# Patient Record
Sex: MALE | Race: WHITE | NOT HISPANIC OR LATINO | Employment: UNEMPLOYED | URBAN - METROPOLITAN AREA
[De-identification: names, ages, dates, MRNs, and addresses within clinical notes are randomized per-mention and may not be internally consistent; named-entity substitution may affect disease eponyms.]

---

## 2020-06-05 ENCOUNTER — HOSPITAL ENCOUNTER (INPATIENT)
Facility: HOSPITAL | Age: 50
LOS: 1 days | Discharge: HOME | DRG: 536 | End: 2020-06-06
Attending: EMERGENCY MEDICINE | Admitting: INTERNAL MEDICINE
Payer: MEDICAID

## 2020-06-05 DIAGNOSIS — Z72.0 TOBACCO ABUSE: ICD-10-CM

## 2020-06-05 DIAGNOSIS — S72.052D CLOSED FRACTURE OF HEAD OF LEFT FEMUR WITH ROUTINE HEALING, SUBSEQUENT ENCOUNTER: ICD-10-CM

## 2020-06-05 DIAGNOSIS — S46.911D STRAIN OF ACROMIOCLAVICULAR JOINT, RIGHT, SUBSEQUENT ENCOUNTER: ICD-10-CM

## 2020-06-05 DIAGNOSIS — S72.052A CLOSED FRACTURE OF HEAD OF LEFT FEMUR, INITIAL ENCOUNTER: Primary | ICD-10-CM

## 2020-06-05 DIAGNOSIS — S01.81XA LACERATION OF FOREHEAD, INITIAL ENCOUNTER: ICD-10-CM
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
## 2020-06-05 DIAGNOSIS — S46.919A: ICD-10-CM

## 2020-06-05 DIAGNOSIS — S72.059A: ICD-10-CM

## 2020-06-05 DIAGNOSIS — S46.911A STRAIN OF ACROMIOCLAVICULAR JOINT, RIGHT, INITIAL ENCOUNTER: ICD-10-CM

## 2020-06-05 LAB
APTT BLDCRRT: 25 SEC (ref 21–32)
BASOPHILS # BLD AUTO: 0.04 K/UL (ref 0–0.2)
BASOPHILS NFR BLD: 0.3 % (ref 0–1.9)
DIFFERENTIAL METHOD: ABNORMAL
EOSINOPHIL # BLD AUTO: 0.3 K/UL (ref 0–0.5)
EOSINOPHIL NFR BLD: 2.2 % (ref 0–8)
ERYTHROCYTE [DISTWIDTH] IN BLOOD BY AUTOMATED COUNT: 12.5 % (ref 11.5–14.5)
HCT VFR BLD AUTO: 45.3 % (ref 40–54)
HGB BLD-MCNC: 15.7 G/DL (ref 14–18)
IMM GRANULOCYTES # BLD AUTO: 0.04 K/UL (ref 0–0.04)
IMM GRANULOCYTES NFR BLD AUTO: 0.3 % (ref 0–0.5)
INR PPP: 1 (ref 0.8–1.2)
LYMPHOCYTES # BLD AUTO: 2.9 K/UL (ref 1–4.8)
LYMPHOCYTES NFR BLD: 23.6 % (ref 18–48)
MCH RBC QN AUTO: 33.1 PG (ref 27–31)
MCHC RBC AUTO-ENTMCNC: 34.7 G/DL (ref 32–36)
MCV RBC AUTO: 96 FL (ref 82–98)
MONOCYTES # BLD AUTO: 1.2 K/UL (ref 0.3–1)
MONOCYTES NFR BLD: 10 % (ref 4–15)
NEUTROPHILS # BLD AUTO: 7.9 K/UL (ref 1.8–7.7)
NEUTROPHILS NFR BLD: 63.6 % (ref 38–73)
NRBC BLD-RTO: 0 /100 WBC
PLATELET # BLD AUTO: 346 K/UL (ref 150–350)
PMV BLD AUTO: 9.3 FL (ref 9.2–12.9)
PROTHROMBIN TIME: 10.6 SEC (ref 9–12.5)
RBC # BLD AUTO: 4.74 M/UL (ref 4.6–6.2)
SARS-COV-2 RDRP RESP QL NAA+PROBE: NEGATIVE
WBC # BLD AUTO: 12.39 K/UL (ref 3.9–12.7)

## 2020-06-05 PROCEDURE — 99233 PR SUBSEQUENT HOSPITAL CARE,LEVL III: ICD-10-PCS | Mod: ,,, | Performed by: ORTHOPAEDIC SURGERY

## 2020-06-05 PROCEDURE — 99285 EMERGENCY DEPT VISIT HI MDM: CPT | Mod: 25

## 2020-06-05 PROCEDURE — 12041 INTMD RPR N-HF/GENIT 2.5CM/<: CPT

## 2020-06-05 PROCEDURE — 93010 ELECTROCARDIOGRAM REPORT: CPT | Mod: ,,, | Performed by: INTERNAL MEDICINE

## 2020-06-05 PROCEDURE — 25000003 PHARM REV CODE 250: Performed by: PHYSICIAN ASSISTANT

## 2020-06-05 PROCEDURE — 85730 THROMBOPLASTIN TIME PARTIAL: CPT

## 2020-06-05 PROCEDURE — U0002 COVID-19 LAB TEST NON-CDC: HCPCS

## 2020-06-05 PROCEDURE — 12013 RPR F/E/E/N/L/M 2.6-5.0 CM: CPT | Mod: ,,, | Performed by: PHYSICIAN ASSISTANT

## 2020-06-05 PROCEDURE — S4991 NICOTINE PATCH NONLEGEND: HCPCS | Performed by: PHYSICIAN ASSISTANT

## 2020-06-05 PROCEDURE — 99233 SBSQ HOSP IP/OBS HIGH 50: CPT | Mod: ,,, | Performed by: ORTHOPAEDIC SURGERY

## 2020-06-05 PROCEDURE — 99223 PR INITIAL HOSPITAL CARE,LEVL III: ICD-10-PCS | Mod: ,,, | Performed by: HOSPITALIST

## 2020-06-05 PROCEDURE — 85610 PROTHROMBIN TIME: CPT

## 2020-06-05 PROCEDURE — 84134 ASSAY OF PREALBUMIN: CPT

## 2020-06-05 PROCEDURE — 86901 BLOOD TYPING SEROLOGIC RH(D): CPT

## 2020-06-05 PROCEDURE — 82306 VITAMIN D 25 HYDROXY: CPT

## 2020-06-05 PROCEDURE — 99284 EMERGENCY DEPT VISIT MOD MDM: CPT | Mod: 25,,, | Performed by: PHYSICIAN ASSISTANT

## 2020-06-05 PROCEDURE — 12013 RPR F/E/E/N/L/M 2.6-5.0 CM: CPT | Mod: 59

## 2020-06-05 PROCEDURE — 99223 1ST HOSP IP/OBS HIGH 75: CPT | Mod: ,,, | Performed by: HOSPITALIST

## 2020-06-05 PROCEDURE — 12013 PR RESUPERF WND FACE 2.6-5 CM: ICD-10-PCS | Mod: ,,, | Performed by: PHYSICIAN ASSISTANT

## 2020-06-05 PROCEDURE — 93005 ELECTROCARDIOGRAM TRACING: CPT

## 2020-06-05 PROCEDURE — 84466 ASSAY OF TRANSFERRIN: CPT

## 2020-06-05 PROCEDURE — 80053 COMPREHEN METABOLIC PANEL: CPT

## 2020-06-05 PROCEDURE — 12000002 HC ACUTE/MED SURGE SEMI-PRIVATE ROOM

## 2020-06-05 PROCEDURE — 93010 EKG 12-LEAD: ICD-10-PCS | Mod: ,,, | Performed by: INTERNAL MEDICINE

## 2020-06-05 PROCEDURE — 99284 PR EMERGENCY DEPT VISIT,LEVEL IV: ICD-10-PCS | Mod: 25,,, | Performed by: PHYSICIAN ASSISTANT

## 2020-06-05 PROCEDURE — 85025 COMPLETE CBC W/AUTO DIFF WBC: CPT

## 2020-06-05 RX ORDER — IPRATROPIUM BROMIDE AND ALBUTEROL SULFATE 2.5; .5 MG/3ML; MG/3ML
3 SOLUTION RESPIRATORY (INHALATION) EVERY 6 HOURS PRN
Status: DISCONTINUED | OUTPATIENT
Start: 2020-06-06 | End: 2020-06-06 | Stop reason: HOSPADM

## 2020-06-05 RX ORDER — NAPROXEN 500 MG/1
500 TABLET ORAL 2 TIMES DAILY WITH MEALS
Qty: 14 TABLET | Refills: 0 | Status: SHIPPED | OUTPATIENT
Start: 2020-06-05 | End: 2020-06-05 | Stop reason: CLARIF

## 2020-06-05 RX ORDER — PROCHLORPERAZINE EDISYLATE 5 MG/ML
5 INJECTION INTRAMUSCULAR; INTRAVENOUS EVERY 6 HOURS PRN
Status: DISCONTINUED | OUTPATIENT
Start: 2020-06-06 | End: 2020-06-06 | Stop reason: HOSPADM

## 2020-06-05 RX ORDER — OXYCODONE HYDROCHLORIDE 10 MG/1
10 TABLET ORAL EVERY 6 HOURS PRN
Status: DISCONTINUED | OUTPATIENT
Start: 2020-06-06 | End: 2020-06-06 | Stop reason: HOSPADM

## 2020-06-05 RX ORDER — IBUPROFEN 200 MG
24 TABLET ORAL
Status: DISCONTINUED | OUTPATIENT
Start: 2020-06-06 | End: 2020-06-06 | Stop reason: HOSPADM

## 2020-06-05 RX ORDER — TALC
6 POWDER (GRAM) TOPICAL NIGHTLY PRN
Status: DISCONTINUED | OUTPATIENT
Start: 2020-06-06 | End: 2020-06-06 | Stop reason: HOSPADM

## 2020-06-05 RX ORDER — HYDROCODONE BITARTRATE AND ACETAMINOPHEN 5; 325 MG/1; MG/1
1 TABLET ORAL
Status: COMPLETED | OUTPATIENT
Start: 2020-06-05 | End: 2020-06-05

## 2020-06-05 RX ORDER — ACETAMINOPHEN 500 MG
1000 TABLET ORAL
Status: COMPLETED | OUTPATIENT
Start: 2020-06-05 | End: 2020-06-05

## 2020-06-05 RX ORDER — OXYCODONE HYDROCHLORIDE 5 MG/1
5 TABLET ORAL EVERY 6 HOURS PRN
Status: DISCONTINUED | OUTPATIENT
Start: 2020-06-06 | End: 2020-06-06 | Stop reason: HOSPADM

## 2020-06-05 RX ORDER — SODIUM CHLORIDE 0.9 % (FLUSH) 0.9 %
10 SYRINGE (ML) INJECTION
Status: DISCONTINUED | OUTPATIENT
Start: 2020-06-06 | End: 2020-06-06 | Stop reason: HOSPADM

## 2020-06-05 RX ORDER — IBUPROFEN 200 MG
1 TABLET ORAL
Status: DISCONTINUED | OUTPATIENT
Start: 2020-06-05 | End: 2020-06-06 | Stop reason: HOSPADM

## 2020-06-05 RX ORDER — ONDANSETRON 2 MG/ML
4 INJECTION INTRAMUSCULAR; INTRAVENOUS EVERY 8 HOURS PRN
Status: DISCONTINUED | OUTPATIENT
Start: 2020-06-06 | End: 2020-06-06 | Stop reason: HOSPADM

## 2020-06-05 RX ORDER — OXYCODONE HYDROCHLORIDE 5 MG/1
5 TABLET ORAL
Status: DISCONTINUED | OUTPATIENT
Start: 2020-06-05 | End: 2020-06-05

## 2020-06-05 RX ORDER — ACETAMINOPHEN 325 MG/1
650 TABLET ORAL EVERY 4 HOURS PRN
Status: DISCONTINUED | OUTPATIENT
Start: 2020-06-06 | End: 2020-06-06 | Stop reason: HOSPADM

## 2020-06-05 RX ORDER — NAPROXEN 500 MG/1
500 TABLET ORAL
Status: COMPLETED | OUTPATIENT
Start: 2020-06-05 | End: 2020-06-05

## 2020-06-05 RX ORDER — LIDOCAINE HYDROCHLORIDE 10 MG/ML
5 INJECTION, SOLUTION EPIDURAL; INFILTRATION; INTRACAUDAL; PERINEURAL
Status: COMPLETED | OUTPATIENT
Start: 2020-06-05 | End: 2020-06-05

## 2020-06-05 RX ORDER — IBUPROFEN 200 MG
16 TABLET ORAL
Status: DISCONTINUED | OUTPATIENT
Start: 2020-06-06 | End: 2020-06-06 | Stop reason: HOSPADM

## 2020-06-05 RX ADMIN — LIDOCAINE HYDROCHLORIDE 50 MG: 10 INJECTION, SOLUTION EPIDURAL; INFILTRATION; INTRACAUDAL; PERINEURAL at 06:06

## 2020-06-05 RX ADMIN — ACETAMINOPHEN 1000 MG: 500 TABLET ORAL at 06:06

## 2020-06-05 RX ADMIN — NICOTINE 1 PATCH: 21 PATCH, EXTENDED RELEASE TRANSDERMAL at 11:06

## 2020-06-05 RX ADMIN — HYDROCODONE BITARTRATE AND ACETAMINOPHEN 1 TABLET: 5; 325 TABLET ORAL at 07:06

## 2020-06-05 RX ADMIN — NAPROXEN 500 MG: 500 TABLET ORAL at 06:06
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
## 2020-06-05 NOTE — ED NOTES
LOC: The patient is awake, alert, and oriented to place, time, situation. Affect is appropriate.  Speech is appropriate and clear.     APPEARANCE: Patient resting comfortably in no acute distress.  Patient is clean and well groomed.    SKIN: The skin is warm and dry; color consistent with ethnicity.  Patient has normal skin turgor and moist mucus membranes.  Skin laceration to upper portion of his head. .     MUSCULOSKELETAL: Pt unable to have from due to pain of right shoulder, moves left UUE w/o problems, able to ambulate with steady gait, denies weakness.     RESPIRATORY: Airway is open and patent. Respirations spontaneous, even, easy, and non-labored.  Patient has a normal effort and rate.  No accessory muscle use noted. Denies cough.     CARDIAC:  Normal rhythm and rate noted.  No peripheral edema noted. No complaints of chest pain.      ABDOMEN: Soft and non tender to palpation.  No distention noted.     NEUROLOGIC: Eyes open spontaneously.  Behavior appropriate to situation.  Follows commands; facial expression symmetrical.  Purposeful motor response noted; normal sensation in all extremities.
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
## 2020-06-06 VITALS
SYSTOLIC BLOOD PRESSURE: 119 MMHG | RESPIRATION RATE: 18 BRPM | TEMPERATURE: 97 F | HEIGHT: 72 IN | HEART RATE: 52 BPM | WEIGHT: 140 LBS | OXYGEN SATURATION: 100 % | DIASTOLIC BLOOD PRESSURE: 74 MMHG | BODY MASS INDEX: 18.96 KG/M2

## 2020-06-06 PROBLEM — Z72.0 TOBACCO ABUSE: Status: ACTIVE | Noted: 2020-06-06

## 2020-06-06 LAB
25(OH)D3+25(OH)D2 SERPL-MCNC: 43 NG/ML (ref 30–96)
ABO + RH BLD: NORMAL
ALBUMIN SERPL BCP-MCNC: 4.5 G/DL (ref 3.5–5.2)
ALP SERPL-CCNC: 74 U/L (ref 55–135)
ALT SERPL W/O P-5'-P-CCNC: 22 U/L (ref 10–44)
ANION GAP SERPL CALC-SCNC: 10 MMOL/L (ref 8–16)
ANION GAP SERPL CALC-SCNC: 14 MMOL/L (ref 8–16)
AST SERPL-CCNC: 22 U/L (ref 10–40)
BASOPHILS # BLD AUTO: 0.04 K/UL (ref 0–0.2)
BASOPHILS NFR BLD: 0.4 % (ref 0–1.9)
BILIRUB SERPL-MCNC: 0.9 MG/DL (ref 0.1–1)
BILIRUB UR QL STRIP: NEGATIVE
BLD GP AB SCN CELLS X3 SERPL QL: NORMAL
BUN SERPL-MCNC: 6 MG/DL (ref 6–20)
BUN SERPL-MCNC: 7 MG/DL (ref 6–20)
CALCIUM SERPL-MCNC: 8.7 MG/DL (ref 8.7–10.5)
CALCIUM SERPL-MCNC: 9.3 MG/DL (ref 8.7–10.5)
CHLORIDE SERPL-SCNC: 106 MMOL/L (ref 95–110)
CHLORIDE SERPL-SCNC: 106 MMOL/L (ref 95–110)
CLARITY UR REFRACT.AUTO: CLEAR
CO2 SERPL-SCNC: 20 MMOL/L (ref 23–29)
CO2 SERPL-SCNC: 22 MMOL/L (ref 23–29)
COLOR UR AUTO: YELLOW
CREAT SERPL-MCNC: 0.7 MG/DL (ref 0.5–1.4)
CREAT SERPL-MCNC: 0.8 MG/DL (ref 0.5–1.4)
DIFFERENTIAL METHOD: ABNORMAL
EOSINOPHIL # BLD AUTO: 0.4 K/UL (ref 0–0.5)
EOSINOPHIL NFR BLD: 3.9 % (ref 0–8)
ERYTHROCYTE [DISTWIDTH] IN BLOOD BY AUTOMATED COUNT: 12.4 % (ref 11.5–14.5)
EST. GFR  (AFRICAN AMERICAN): >60 ML/MIN/1.73 M^2
EST. GFR  (AFRICAN AMERICAN): >60 ML/MIN/1.73 M^2
EST. GFR  (NON AFRICAN AMERICAN): >60 ML/MIN/1.73 M^2
EST. GFR  (NON AFRICAN AMERICAN): >60 ML/MIN/1.73 M^2
GLUCOSE SERPL-MCNC: 75 MG/DL (ref 70–110)
GLUCOSE SERPL-MCNC: 85 MG/DL (ref 70–110)
GLUCOSE UR QL STRIP: NEGATIVE
HCT VFR BLD AUTO: 43.7 % (ref 40–54)
HGB BLD-MCNC: 14.5 G/DL (ref 14–18)
HGB UR QL STRIP: NEGATIVE
IMM GRANULOCYTES # BLD AUTO: 0.03 K/UL (ref 0–0.04)
IMM GRANULOCYTES NFR BLD AUTO: 0.3 % (ref 0–0.5)
KETONES UR QL STRIP: NEGATIVE
LEUKOCYTE ESTERASE UR QL STRIP: NEGATIVE
LYMPHOCYTES # BLD AUTO: 2.8 K/UL (ref 1–4.8)
LYMPHOCYTES NFR BLD: 27.5 % (ref 18–48)
MCH RBC QN AUTO: 33.3 PG (ref 27–31)
MCHC RBC AUTO-ENTMCNC: 33.2 G/DL (ref 32–36)
MCV RBC AUTO: 100 FL (ref 82–98)
MONOCYTES # BLD AUTO: 1.2 K/UL (ref 0.3–1)
MONOCYTES NFR BLD: 11.9 % (ref 4–15)
NEUTROPHILS # BLD AUTO: 5.7 K/UL (ref 1.8–7.7)
NEUTROPHILS NFR BLD: 56 % (ref 38–73)
NITRITE UR QL STRIP: NEGATIVE
NRBC BLD-RTO: 0 /100 WBC
PH UR STRIP: 5 [PH] (ref 5–8)
PLATELET # BLD AUTO: 283 K/UL (ref 150–350)
PMV BLD AUTO: 9.7 FL (ref 9.2–12.9)
POTASSIUM SERPL-SCNC: 3.8 MMOL/L (ref 3.5–5.1)
POTASSIUM SERPL-SCNC: 4 MMOL/L (ref 3.5–5.1)
PREALB SERPL-MCNC: 34 MG/DL (ref 20–43)
PROT SERPL-MCNC: 7.8 G/DL (ref 6–8.4)
PROT UR QL STRIP: NEGATIVE
RBC # BLD AUTO: 4.36 M/UL (ref 4.6–6.2)
SODIUM SERPL-SCNC: 138 MMOL/L (ref 136–145)
SODIUM SERPL-SCNC: 140 MMOL/L (ref 136–145)
SP GR UR STRIP: 1.01 (ref 1–1.03)
TRANSFERRIN SERPL-MCNC: 288 MG/DL (ref 200–375)
URN SPEC COLLECT METH UR: NORMAL
WBC # BLD AUTO: 10.13 K/UL (ref 3.9–12.7)

## 2020-06-06 PROCEDURE — 97161 PT EVAL LOW COMPLEX 20 MIN: CPT

## 2020-06-06 PROCEDURE — 99238 HOSP IP/OBS DSCHRG MGMT 30/<: CPT | Mod: ,,, | Performed by: INTERNAL MEDICINE

## 2020-06-06 PROCEDURE — 80048 BASIC METABOLIC PNL TOTAL CA: CPT

## 2020-06-06 PROCEDURE — 81003 URINALYSIS AUTO W/O SCOPE: CPT

## 2020-06-06 PROCEDURE — 36415 COLL VENOUS BLD VENIPUNCTURE: CPT

## 2020-06-06 PROCEDURE — 85025 COMPLETE CBC W/AUTO DIFF WBC: CPT

## 2020-06-06 PROCEDURE — 99238 PR HOSPITAL DISCHARGE DAY,<30 MIN: ICD-10-PCS | Mod: ,,, | Performed by: INTERNAL MEDICINE

## 2020-06-06 PROCEDURE — 97116 GAIT TRAINING THERAPY: CPT

## 2020-06-06 PROCEDURE — 25000003 PHARM REV CODE 250: Performed by: HOSPITALIST

## 2020-06-06 RX ORDER — SODIUM CHLORIDE 9 MG/ML
INJECTION, SOLUTION INTRAVENOUS CONTINUOUS
Status: DISCONTINUED | OUTPATIENT
Start: 2020-06-06 | End: 2020-06-06

## 2020-06-06 RX ORDER — OXYCODONE AND ACETAMINOPHEN 5; 325 MG/1; MG/1
1 TABLET ORAL EVERY 6 HOURS PRN
Qty: 20 TABLET | Refills: 0 | Status: ON HOLD | OUTPATIENT
Start: 2020-06-06 | End: 2021-10-01 | Stop reason: HOSPADM

## 2020-06-06 RX ADMIN — SODIUM CHLORIDE: 0.9 INJECTION, SOLUTION INTRAVENOUS at 11:06

## 2020-06-06 RX ADMIN — ACETAMINOPHEN 650 MG: 325 TABLET ORAL at 12:06

## 2020-06-06 RX ADMIN — OXYCODONE HYDROCHLORIDE 5 MG: 5 TABLET ORAL at 07:06

## 2020-06-06 RX ADMIN — SODIUM CHLORIDE: 0.9 INJECTION, SOLUTION INTRAVENOUS at 01:06

## 2020-06-06 RX ADMIN — OXYCODONE HYDROCHLORIDE 10 MG: 10 TABLET ORAL at 12:06

## 2020-06-06 RX ADMIN — OXYCODONE HYDROCHLORIDE 10 MG: 10 TABLET ORAL at 02:06

## 2020-06-06 NOTE — CARE UPDATE
CT scan reviewed, patient may bear weight as tolerated LLE. Likely to need WINTER relatively soon. Patient does not wish to follow up with Dr Jaime. Will arrange for fu in joints clinic when discharged. Ok for DC from ortho perspective after works with PT. Please call with any questions.
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
## 2020-06-06 NOTE — HOSPITAL COURSE
"Patient underwent imaging of left hip with X-ray and CT scan of pelvis that showed acute fracture of the left femoral head associated with impaction without significant displacement. Additional imaging of CT scan of head and cervical spine were unremarkable. CT scan fo right shoulder showed "posterior malalignment of the clavicle with the acromion on the order of 9 mm.  There is some subchondral sclerosis and cystic degenerative change which may suggest a acute on chronic or chronic injury.  Surrounding fat stranding is relatively mild." Orthopedics consulted and stated hat patient will need left hip replacement in near future but deferring at this time and stated patient okay for WBAT to LLE and to have PT/OT evaluate. Patient admitted for pain control and therapy evaluation and discharge pending recs from therapy and pain control. Patient reports more pain in right shoulder than left hip. Patient evaluated by therapy who recommended patient okay for home discharge with straight cane that was ordered and delivered to patient at bedside. Patient discharged with Percocet 5/325 1 tablet po every 6 hours prn pain (Disp #20) and follow-up in [facility] Clinic in 1 week that Ortho to arrange.   "

## 2020-06-06 NOTE — SUBJECTIVE & OBJECTIVE
Interval History: Orthopedics evaluated patient and stated no operative repair needed at this time to left hip femoral head fracture but state patient will need left WINTER in near future and will follow-up with patient on discharge as outpatient. Patient reports 5/10 pain to right shoulder and 3/10 pain to left hip.     Review of Systems   Constitutional: Negative for chills and fever.   Respiratory: Negative for cough and shortness of breath.    Cardiovascular: Negative for chest pain and palpitations.   Gastrointestinal: Negative for abdominal pain, diarrhea and nausea.   Musculoskeletal: Positive for arthralgias (Left hip and right shoulder ).   Skin: Negative for rash.   Neurological: Negative for dizziness and light-headedness.   Psychiatric/Behavioral: Negative for confusion and hallucinations.     Objective:     Vital Signs (Most Recent):  Temp: 97 °F (36.1 °C) (06/06/20 1106)  Pulse: (!) 52 (06/06/20 1106)  Resp: 18 (06/06/20 1106)  BP: 119/74 (06/06/20 1106)  SpO2: 100 % (06/06/20 1106) Vital Signs (24h Range):  Temp:  [97 °F (36.1 °C)-98 °F (36.7 °C)] 97 °F (36.1 °C)  Pulse:  [52-74] 52  Resp:  [16-18] 18  SpO2:  [96 %-100 %] 100 %  BP: (109-140)/(69-98) 119/74     Weight: 63.5 kg (140 lb)  Body mass index is 18.99 kg/m².    Intake/Output Summary (Last 24 hours) at 6/6/2020 1512  Last data filed at 6/6/2020 0751  Gross per 24 hour   Intake 656.67 ml   Output 350 ml   Net 306.67 ml      Physical Exam   Constitutional: He is oriented to person, place, and time. He appears well-developed and well-nourished. No distress.   HENT:   Mouth/Throat: Oropharynx is clear and moist.   Eyes: Conjunctivae are normal.   Cardiovascular: Normal rate, regular rhythm and normal heart sounds. Exam reveals no gallop and no friction rub.   No murmur heard.  Pulmonary/Chest: Effort normal and breath sounds normal. No respiratory distress. He has no wheezes. He has no rales.   Abdominal: Soft. Bowel sounds are normal. He exhibits  no distension. There is no tenderness. There is no guarding.   Musculoskeletal: He exhibits no edema.   Neurological: He is alert and oriented to person, place, and time.   Skin: No rash noted. No erythema.   Psychiatric: He has a normal mood and affect. His behavior is normal. Thought content normal.   Nursing note and vitals reviewed.      Significant Labs:   CBC:   Recent Labs   Lab 06/05/20 2244 06/06/20  0256   WBC 12.39 10.13   HGB 15.7 14.5   HCT 45.3 43.7    283     CMP:   Recent Labs   Lab 06/05/20 2244 06/06/20  0256    138   K 4.0 3.8    106   CO2 20* 22*   GLU 85 75   BUN 6 7   CREATININE 0.8 0.7   CALCIUM 9.3 8.7   PROT 7.8  --    ALBUMIN 4.5  --    BILITOT 0.9  --    ALKPHOS 74  --    AST 22  --    ALT 22  --    ANIONGAP 14 10   EGFRNONAA >60.0 >60.0     Significant Imaging: I have reviewed all pertinent imaging results/findings within the past 24 hours.
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
## 2020-06-06 NOTE — PROGRESS NOTES
[facility]  Orthopedics  Progress Note    Patient Name: Ramos Stewart  MRN: [id number]  Admission Date: 6/5/2020  Hospital Length of Stay: 1 days  Attending Provider: Idania Ambrosio MD  Primary Care Provider: Primary Doctor No    Subjective:     Principal Problem:Fracture of femoral head    Principal Orthopedic Problem: same    Interval History: Patient seen and examined at bedside.  No acute events overnight.  Pain controlled.  Npo since midnight  Review of patient's allergies indicates:  No Known Allergies    Current Facility-Administered Medications   Medication    0.9%  NaCl infusion    acetaminophen tablet 650 mg    albuterol-ipratropium 2.5 mg-0.5 mg/3 mL nebulizer solution 3 mL    glucose chewable tablet 16 g    glucose chewable tablet 24 g    melatonin tablet 6 mg    nicotine 21 mg/24 hr 1 patch    ondansetron injection 4 mg    oxyCODONE immediate release tablet 10 mg    oxyCODONE immediate release tablet 5 mg    prochlorperazine injection Soln 5 mg    sodium chloride 0.9% flush 10 mL     Objective:     Vital Signs (Most Recent):  Temp: 97.6 °F (36.4 °C) (06/06/20 0445)  Pulse: (!) 52 (06/06/20 0445)  Resp: 16 (06/05/20 2230)  BP: 119/84 (06/06/20 0445)  SpO2: 98 % (06/06/20 0445) Vital Signs (24h Range):  Temp:  [97.6 °F (36.4 °C)-98 °F (36.7 °C)] 97.6 °F (36.4 °C)  Pulse:  [52-74] 52  Resp:  [16] 16  SpO2:  [96 %-99 %] 98 %  BP: (112-140)/(69-98) 119/84     Weight: 63.5 kg (140 lb)  Height: 6' (182.9 cm)  Body mass index is 18.99 kg/m².      Intake/Output Summary (Last 24 hours) at 6/6/2020 0630  Last data filed at 6/6/2020 0034  Gross per 24 hour   Intake --   Output 200 ml   Net -200 ml       Ortho/SPM Exam  AAOx4  NAD  Reg rate  No increased WOB  SILT T/SP/DP/Wilcox/Sa  Motor intact T/SP/DP  WWP extremities  FCDs in place and functioning    Significant Labs:   CBC:   Recent Labs   Lab 06/05/20  2244 06/06/20  0256   WBC 12.39 10.13   HGB 15.7 14.5   HCT 45.3 43.7     283     CMP:   Recent Labs   Lab 06/05/20  2244 06/06/20  0256    138   K 4.0 3.8    106   CO2 20* 22*   GLU 85 75   BUN 6 7   CREATININE 0.8 0.7   CALCIUM 9.3 8.7   PROT 7.8  --    ALBUMIN 4.5  --    BILITOT 0.9  --    ALKPHOS 74  --    AST 22  --    ALT 22  --    ANIONGAP 14 10   EGFRNONAA >60.0 >60.0     All pertinent labs within the past 24 hours have been reviewed.    Significant Imaging: I have reviewed all pertinent imaging results/findings.   Left femoral head fx      Assessment/Plan:     *  Left pipken 2 Fracture of femoral head with protrusion of superior femoral neck screw  Ramos Stewart is a 49 y.o. male with Left femoral head fracture, closed, NVI.      -Admitted to medicine service last night  -NPO since midnight as precuation  -Pain control  -PT/OT: Bed rest for now, will update weight bearing status this am  -DVT PPx: Hold chemical anticoagulation for now  -Labs: Hgb stable at 14                    Kraig Alvarez MD  Orthopedics  [facility]
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
## 2020-06-06 NOTE — PROGRESS NOTES
Report received. Care assumed. Patient arrived via stretcher AAO x4. Pt lying supine in bed. Pt denies pain or any other concerns at this time. See assessment. Patient oriented to room. Bed in lowest position, side rails up x2, bed wheels locked and call light within reach, NADK. Will continue to monitor.

## 2020-06-06 NOTE — PLAN OF CARE
Plan of care reviewed with pt. Pt verbalizes understanding. Pt AA&O x 4, VSS, and no s/s of distress. Discharge instructions given to and reviewed with pt. PIV x 1 discontinued with catheter intact. Cane and medications delivered to bedside. Pt transported via wheelchair by hospital staff to vehicle.
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
## 2020-06-06 NOTE — PLAN OF CARE
Pt being discharged home and will need a straight cane.  CM put thru ODME, faxed to [phone].  Cane delivered to room.  Pt ready to discharge home.
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
## 2020-06-06 NOTE — SUBJECTIVE & OBJECTIVE
Principal Problem:Fracture of femoral head    Principal Orthopedic Problem: same    Interval History: Patient seen and examined at bedside.  No acute events overnight.  Pain controlled.  Npo since midnight  Review of patient's allergies indicates:  No Known Allergies    Current Facility-Administered Medications   Medication    0.9%  NaCl infusion    acetaminophen tablet 650 mg    albuterol-ipratropium 2.5 mg-0.5 mg/3 mL nebulizer solution 3 mL    glucose chewable tablet 16 g    glucose chewable tablet 24 g    melatonin tablet 6 mg    nicotine 21 mg/24 hr 1 patch    ondansetron injection 4 mg    oxyCODONE immediate release tablet 10 mg    oxyCODONE immediate release tablet 5 mg    prochlorperazine injection Soln 5 mg    sodium chloride 0.9% flush 10 mL     Objective:     Vital Signs (Most Recent):  Temp: 97.6 °F (36.4 °C) (06/06/20 0445)  Pulse: (!) 52 (06/06/20 0445)  Resp: 16 (06/05/20 2230)  BP: 119/84 (06/06/20 0445)  SpO2: 98 % (06/06/20 0445) Vital Signs (24h Range):  Temp:  [97.6 °F (36.4 °C)-98 °F (36.7 °C)] 97.6 °F (36.4 °C)  Pulse:  [52-74] 52  Resp:  [16] 16  SpO2:  [96 %-99 %] 98 %  BP: (112-140)/(69-98) 119/84     Weight: 63.5 kg (140 lb)  Height: 6' (182.9 cm)  Body mass index is 18.99 kg/m².      Intake/Output Summary (Last 24 hours) at 6/6/2020 0630  Last data filed at 6/6/2020 0034  Gross per 24 hour   Intake --   Output 200 ml   Net -200 ml       Ortho/SPM Exam  AAOx4  NAD  Reg rate  No increased WOB  SILT T/SP/DP/Wilcox/Sa  Motor intact T/SP/DP  WWP extremities  FCDs in place and functioning    Significant Labs:   CBC:   Recent Labs   Lab 06/05/20 2244 06/06/20  0256   WBC 12.39 10.13   HGB 15.7 14.5   HCT 45.3 43.7    283     CMP:   Recent Labs   Lab 06/05/20 2244 06/06/20  0256    138   K 4.0 3.8    106   CO2 20* 22*   GLU 85 75   BUN 6 7   CREATININE 0.8 0.7   CALCIUM 9.3 8.7   PROT 7.8  --    ALBUMIN 4.5  --    BILITOT 0.9  --    ALKPHOS 74  --    AST 22  --    ALT  22  --    ANIONGAP 14 10   EGFRNONAA >60.0 >60.0     All pertinent labs within the past 24 hours have been reviewed.    Significant Imaging: I have reviewed all pertinent imaging results/findings.   Left femoral head fx

## 2020-06-06 NOTE — ASSESSMENT & PLAN NOTE
Patient advised on need for tobaaco cessation to help with any bone healing. Patient placed on Nicotine patch daily in hospital to help with smoking cessation.

## 2020-06-06 NOTE — ASSESSMENT & PLAN NOTE
Ramos Stewart is a 49 y.o. male with Left femoral head fracture, closed, NVI.      -Admitted to medicine service last night  -NPO since midnight as precuation  -Pain control  -PT/OT: Bed rest for now, will update weight bearing status this am  -DVT PPx: Hold chemical anticoagulation for now  -Labs: Hgb stable at 14

## 2020-06-06 NOTE — ASSESSMENT & PLAN NOTE
Ramos Stewart is a 49 y.o. male with Left femoral head fracture, closed, NVI.  Patient was explained in detail the severity of the injury that was suffered. At this point, will obtain the CT scan for further evaluation of the fracture. The patient will be made bed rest for now, will update weight bearing status following CT. Will admit to hospital, NPO overnight. All questions were answered to patient's  satisfaction.     -Admitted to medicine service  -NPO midnight as precuation  -Pain control  -PT/OT: Bed rest for now, will update after CT scan  -DVT PPx: Hold chemical anticoagulation  -Labs: pending  -Iv: ordered for contralateral arm

## 2020-06-06 NOTE — ASSESSMENT & PLAN NOTE
· Orthopedics consulted and evaluated and stated that patient may bear weight as tolerated LLE. Likely to need WINTER relatively soon. Orthopedics will arrange for follow-up in Joint Clinic when discharged. Ok for discharge from Ortho perspective after works with PT.   · PT/OT consulted and patient awaiting evaluation.   · Pain control with Tylenol and Oxycodone.

## 2020-06-06 NOTE — SUBJECTIVE & OBJECTIVE
No past medical history on file.    No past surgical history on file.    Review of patient's allergies indicates:  No Known Allergies    Current Facility-Administered Medications   Medication    nicotine 21 mg/24 hr 1 patch     No current outpatient medications on file.     Family History     None        Tobacco Use    Smoking status: Not on file   Substance and Sexual Activity    Alcohol use: Not on file    Drug use: Not on file    Sexual activity: Not on file     ROS   Per ED ROS 6/5/20  Objective:     Vital Signs (Most Recent):  Temp: 97.9 °F (36.6 °C) (06/05/20 1654)  Pulse: 74 (06/05/20 1654)  Resp: 16 (06/05/20 1654)  BP: (!) 140/98 (06/05/20 1654)  SpO2: 99 % (06/05/20 1654) Vital Signs (24h Range):  Temp:  [97.9 °F (36.6 °C)] 97.9 °F (36.6 °C)  Pulse:  [74] 74  Resp:  [16] 16  SpO2:  [99 %] 99 %  BP: (140)/(98) 140/98     Weight: 63.5 kg (140 lb)  Height: 6' (182.9 cm)  Body mass index is 18.99 kg/m².    No intake or output data in the 24 hours ending 06/05/20 2248    Ortho/SPM Exam  Vitals: Afebrile.  Vital signs stable.  General: No acute distress.  Cardio: Regular rate.  Chest: No increased work of breathing.    LLE    Pain with Log roll of leg  No bony TTP throughout  Compartments soft  Painless ROM ankle   Mild  Pain with ROM of knee  SILT Sa/Wilcox/DP/SP/T  Motor intact TA/SP/DP  2+ DP and PT     RLE:  Skin intact, no deformity  No TTP  Compartments soft  Full painless ROM throughout lower extremity  SILT Sa/Wilcox/DP/SP/T  Motor intact TA/SP/DP  2+ DP/PT     RUE  Skin intact, no deformity noted  No open wounds/abrasions/crepitus  TTP over right shoulder region  No bony TTP mid or distal humerus  FROM elbow and wrist  SILT M/U/R  Motor intact AIN/PIN/M/U/R   Cap refill < 2s  2+ RP    LUE:  Skin intact, no deformity noted  No open wounds/abrasions/crepitus  No bony TTP  FROM shoulder, elbow and wrist  SILT M/U/R  Motor intact AIN/PIN/M/U/R   Cap refill < 2s  2+ RP      Spine: No TTP along spine, no step  offs palpated. no sacral decubitus ulcers      Significant1 Labs: pending    Significant Imaging: I have reviewed all pertinent imaging results/findings.   Xray right shoulder; no fx or dislocation  Xray left hip: Retained orthopedic hardware, pipken 2 femoral head fracture  CT right shoulder: concern for right acromion fx, no extension into the glenoid or spine of scapula  CT left hip pending
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
## 2020-06-07 NOTE — ASSESSMENT & PLAN NOTE
· Orthopedics consulted and evaluated and stated that patient may bear weight as tolerated LLE. Likely to need WINTER relatively soon. Orthopedics will arrange for follow-up in [facility] when discharged. Ok for discharge from Ortho perspective after works with PT.   · PT/OT consulted and evaluated patient and okay for home discharge. Patient provided straight cane for DME prior to discharge to help with ambulation.  · Patient discharged with Percocet 5/325 1 tablet po every 4 hours for pain (disp #20).

## 2020-06-08 ENCOUNTER — TELEPHONE (OUTPATIENT)
Dept: ORTHOPEDICS | Facility: CLINIC | Age: 50
End: 2020-06-08
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
## 2020-06-08 NOTE — TELEPHONE ENCOUNTER
Attempted to call patient to discuss insurance. Phone rang and then said phone was out of service. Voicemail was unavailable.

## 2020-06-09 ENCOUNTER — TELEPHONE (OUTPATIENT)
Dept: ORTHOPEDICS | Facility: CLINIC | Age: 50
End: 2020-06-09

## 2020-06-09 NOTE — TELEPHONE ENCOUNTER
----- Message from Kely Dean sent at 6/9/2020 10:56 AM CDT -----  Contact: Ramos  Type:  Sooner Apoointment Request    Caller is requesting a sooner appointment.  Caller declined first available appointment listed below.  Caller will not accept being placed on the waitlist and is requesting a message be sent to doctor.  Name of Caller: Ramos  When is the first available appointment? 7/7/2020  Symptoms: hospital follow up  Would the patient rather a call back or a response via [facility]?  Call back  Best Call Back Number: [phone]  Additional Information: Pt scheduled for 7/7/2020 and placed on waiting list but is in considerable amount of pain, would like to be seen sooner, please    Good morning, I scheduled an appt for the pt on 7/7/2020, the appt notes says hospital follow up//arm/elbow, but the affected body part is his hip. I'm not sure where I went wrong scheduling his appt, I'm sorry for any misunderstandings. If any questions, please call the pt at [phone].

## 2020-06-11 ENCOUNTER — TELEPHONE (OUTPATIENT)
Dept: SPORTS MEDICINE | Facility: CLINIC | Age: 50
End: 2020-06-11

## 2020-06-11 ENCOUNTER — TELEPHONE (OUTPATIENT)
Dept: ORTHOPEDICS | Facility: CLINIC | Age: 50
End: 2020-06-11

## 2020-06-11 NOTE — TELEPHONE ENCOUNTER
**PATIENT NEEDS TO BE SEEN AT [facility], MEDICAID**    Contacted patient re: right shoulder ac jt sprain. KAVITHA.      Tirso Tiwari   Orthopaedic Clinical Assistant  [facility] [place]       -----   Message from Kizzy Nunez sent at 6/11/2020 11:11 AM CDT -----  Contact: patient  Patient has ER discharge note to be seen in sports medicine on tomorrow 6.12.20.    Called to schedule.    He can be reached at [phone]    Thanks  KB

## 2020-06-11 NOTE — TELEPHONE ENCOUNTER
----- Message from Kizzy Nunez sent at 6/11/2020 11:14 AM CDT -----  Contact: patient  Patient called for refill of pain medication.     He can be reached at [phone]    Thanks  KB

## 2021-08-25 ENCOUNTER — HOSPITAL ENCOUNTER (EMERGENCY)
Facility: HOSPITAL | Age: 51
Discharge: HOME | End: 2021-08-25
Attending: EMERGENCY MEDICINE
Payer: MEDICAID

## 2021-08-25 VITALS
TEMPERATURE: 98 F | RESPIRATION RATE: 18 BRPM | OXYGEN SATURATION: 96 % | WEIGHT: 134.69 LBS | SYSTOLIC BLOOD PRESSURE: 108 MMHG | HEART RATE: 83 BPM | DIASTOLIC BLOOD PRESSURE: 77 MMHG | BODY MASS INDEX: 18.27 KG/M2

## 2021-08-25 DIAGNOSIS — H60.502 ACUTE OTITIS EXTERNA OF LEFT EAR, UNSPECIFIED TYPE: Primary | ICD-10-CM

## 2021-08-25 DIAGNOSIS — H66.92 LEFT OTITIS MEDIA, UNSPECIFIED OTITIS MEDIA TYPE: ICD-10-CM

## 2021-08-25 PROCEDURE — 99284 EMERGENCY DEPT VISIT MOD MDM: CPT

## 2021-08-25 PROCEDURE — 25000003 PHARM REV CODE 250: Performed by: NURSE PRACTITIONER

## 2021-08-25 RX ORDER — CIPROFLOXACIN AND DEXAMETHASONE 3; 1 MG/ML; MG/ML
4 SUSPENSION/ DROPS AURICULAR (OTIC) 2 TIMES DAILY
Qty: 7.5 ML | Refills: 0 | Status: ON HOLD | OUTPATIENT
Start: 2021-08-25 | End: 2021-10-01 | Stop reason: HOSPADM

## 2021-08-25 RX ORDER — ACETAMINOPHEN 500 MG
1000 TABLET ORAL
Status: COMPLETED | OUTPATIENT
Start: 2021-08-25 | End: 2021-08-25

## 2021-08-25 RX ORDER — AMOXICILLIN 500 MG/1
500 CAPSULE ORAL 3 TIMES DAILY
Qty: 21 CAPSULE | Refills: 0 | Status: SHIPPED | OUTPATIENT
Start: 2021-08-25 | End: 2021-09-01

## 2021-08-25 RX ORDER — IBUPROFEN 600 MG/1
600 TABLET ORAL
Status: COMPLETED | OUTPATIENT
Start: 2021-08-25 | End: 2021-08-25

## 2021-08-25 RX ORDER — CIPROFLOXACIN AND DEXAMETHASONE 3; 1 MG/ML; MG/ML
4 SUSPENSION/ DROPS AURICULAR (OTIC)
Status: COMPLETED | OUTPATIENT
Start: 2021-08-25 | End: 2021-08-25

## 2021-08-25 RX ORDER — IBUPROFEN 600 MG/1
600 TABLET ORAL EVERY 6 HOURS PRN
Qty: 20 TABLET | Refills: 0 | Status: ON HOLD | OUTPATIENT
Start: 2021-08-25 | End: 2021-10-01 | Stop reason: HOSPADM

## 2021-08-25 RX ADMIN — ACETAMINOPHEN 1000 MG: 500 TABLET ORAL at 01:08

## 2021-08-25 RX ADMIN — IBUPROFEN 600 MG: 200 TABLET, FILM COATED ORAL at 01:08

## 2021-08-25 RX ADMIN — CIPROFLOXACIN AND DEXAMETHASONE 4 DROP: 3; 1 SUSPENSION/ DROPS AURICULAR (OTIC) at 01:08

## 2021-09-29 PROBLEM — S02.19XA TEMPORAL BONE FRACTURE: Status: ACTIVE | Noted: 2021-09-29

## 2021-09-29 PROBLEM — S06.5XAA SUBDURAL HEMATOMA: Status: ACTIVE | Noted: 2021-09-29

## 2021-09-29 PROBLEM — F10.10 ALCOHOL ABUSE: Status: ACTIVE | Noted: 2021-09-29

## 2021-09-29 PROBLEM — S02.119A: Status: ACTIVE | Noted: 2021-09-29

## 2021-09-29 PROBLEM — S01.01XA SCALP LACERATION: Status: ACTIVE | Noted: 2021-09-29

## 2021-09-29 PROBLEM — S06.6XAA SUBARACHNOID HEMATOMA: Status: ACTIVE | Noted: 2021-09-29
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
## 2021-09-29 PROBLEM — S22.32XA FRACTURE OF ONE RIB OF LEFT SIDE: Status: ACTIVE | Noted: 2021-09-29

## 2021-09-29 PROBLEM — S43.102A SEPARATION OF LEFT ACROMIOCLAVICULAR JOINT: Status: ACTIVE | Noted: 2020-06-05

## 2021-09-29 PROBLEM — G93.89 PNEUMOCEPHALUS, TRAUMATIC: Status: ACTIVE | Noted: 2021-09-29

## 2021-10-01 ENCOUNTER — TELEPHONE (OUTPATIENT)
Dept: NEUROSURGERY | Facility: CLINIC | Age: 51
End: 2021-10-01

## 2021-10-01 DIAGNOSIS — S06.5XAA SUBDURAL HEMATOMA: Primary | ICD-10-CM
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
## 2023-01-24 ENCOUNTER — TELEPHONE (OUTPATIENT)
Dept: OPHTHALMOLOGY | Facility: CLINIC | Age: 53
End: 2023-01-24
Payer: MEDICAID

## 2023-01-24 ENCOUNTER — HOSPITAL ENCOUNTER (EMERGENCY)
Facility: HOSPITAL | Age: 53
Discharge: HOME | End: 2023-01-24
Attending: EMERGENCY MEDICINE
Payer: MEDICAID

## 2023-01-24 VITALS
SYSTOLIC BLOOD PRESSURE: 130 MMHG | DIASTOLIC BLOOD PRESSURE: 77 MMHG | HEART RATE: 57 BPM | TEMPERATURE: 98 F | OXYGEN SATURATION: 97 % | WEIGHT: 140 LBS | HEIGHT: 75 IN | RESPIRATION RATE: 18 BRPM | BODY MASS INDEX: 17.41 KG/M2

## 2023-01-24 DIAGNOSIS — H20.9 ANTERIOR UVEITIS: Primary | ICD-10-CM

## 2023-01-24 PROCEDURE — 25000003 PHARM REV CODE 250: Performed by: PHYSICIAN ASSISTANT

## 2023-01-24 PROCEDURE — 99284 EMERGENCY DEPT VISIT MOD MDM: CPT

## 2023-01-24 RX ORDER — PREDNISOLONE ACETATE 10 MG/ML
1 SUSPENSION/ DROPS OPHTHALMIC 3 TIMES DAILY
Qty: 10 ML | Refills: 0 | Status: SHIPPED | OUTPATIENT
Start: 2023-01-24 | End: 2023-02-03

## 2023-01-24 RX ORDER — INDOMETHACIN 50 MG/1
50 CAPSULE ORAL 2 TIMES DAILY WITH MEALS
Qty: 20 CAPSULE | Refills: 0 | Status: SHIPPED | OUTPATIENT
Start: 2023-01-24 | End: 2023-02-03

## 2023-01-24 RX ORDER — PROPARACAINE HYDROCHLORIDE 5 MG/ML
2 SOLUTION/ DROPS OPHTHALMIC
Status: COMPLETED | OUTPATIENT
Start: 2023-01-24 | End: 2023-01-24

## 2023-01-24 RX ADMIN — FLUORESCEIN SODIUM 1 EACH: 1 STRIP OPHTHALMIC at 10:01

## 2023-01-24 RX ADMIN — PROPARACAINE HYDROCHLORIDE 2 DROP: 5 SOLUTION/ DROPS OPHTHALMIC at 10:01

## 2023-01-24 NOTE — ED PROVIDER NOTES
Encounter Date: 1/24/2023       History     Chief Complaint   Patient presents with    Eye Pain     51 yo male to triage for right eye pain and irritation x 1 week. Also endorses sensitivity to light in that eye. Denies vision change, injury/trauma. VSS, NAD, AAOx4     This is a 52 year old male with no significant PMH who presents to the ED complaining of right eye pain that started one week ago.  He states it is progressively getting worse over the last week, now experiencing some blurry vision and photophobia.  He denies any eye trauma or foreign body going into his eye.  He describes the pain as a constant pressure.  He denies itching, or draining from the eye. He does report more crusting in the right eye in the morning.  No treatment attempted prior to coming to the ED.  No previous episodes in the past.  He does not wear glasses or contacts.    Review of patient's allergies indicates:  No Known Allergies  Past Medical History:   Diagnosis Date     Left pipken 2 Fracture of femoral head with protrusion of superior femoral neck screw 6/5/2020    Tobacco abuse 6/6/2020     Past Surgical History:   Procedure Laterality Date    HIP PINNING Left      History reviewed. No pertinent family history.  Social History     Tobacco Use    Smoking status: Never    Smokeless tobacco: Never   Substance Use Topics    Alcohol use: Never    Drug use: Never     Review of Systems   Constitutional:  Negative for chills, diaphoresis and fatigue.   HENT:  Negative for dental problem, drooling, sinus pain and sore throat.    Eyes:  Positive for photophobia, pain and redness. Negative for discharge and itching.   Respiratory:  Negative for cough.    Cardiovascular:  Negative for chest pain.   Gastrointestinal:  Negative for abdominal pain.   Genitourinary:  Negative for dysuria.   Musculoskeletal:  Negative for back pain.   Skin:  Negative for rash.   All other systems reviewed and are negative.    Physical Exam     Initial Vitals  [01/24/23 0927]   BP Pulse Resp Temp SpO2   119/73 69 18 97.9 °F (36.6 °C) 97 %      MAP       --         Physical Exam    Nursing note and vitals reviewed.  Constitutional: He appears well-developed and well-nourished. He is not diaphoretic. No distress.   HENT:   Head: Normocephalic and atraumatic.   Nose: Nose normal.   Eyes: Conjunctivae, EOM and lids are normal. Pupils are equal, round, and reactive to light. Right eye exhibits no discharge and no exudate. Left eye exhibits no discharge and no exudate. Right conjunctiva is not injected. Left conjunctiva is not injected.   There is scleral injection noted to the right eye when compared to the left.   Neck: Neck supple.   Normal range of motion.  Cardiovascular:  Normal rate.           Pulmonary/Chest: Breath sounds normal. No respiratory distress. He has no wheezes. He has no rhonchi. He has no rales.   Abdominal: Abdomen is soft. Bowel sounds are normal. He exhibits no distension. There is no abdominal tenderness. There is no rebound and no guarding.   Musculoskeletal:         General: No tenderness or edema. Normal range of motion.      Cervical back: Normal range of motion and neck supple.     Neurological: He is alert and oriented to person, place, and time. GCS score is 15. GCS eye subscore is 4. GCS verbal subscore is 5. GCS motor subscore is 6.   Skin: Skin is warm. Capillary refill takes less than 2 seconds.       ED Course   Procedures  Labs Reviewed - No data to display       Imaging Results    None          Medications   fluorescein ophthalmic strip 1 each (1 each Left Eye Given by Other 1/24/23 105)   proparacaine 0.5 % ophthalmic solution 2 drop (2 drops Left Eye Given by Other 1/24/23 1058)           APC / Resident Notes:   This is an urgent evaluation of a 52-year-old male who presents to the emergency department complaining of right eye redness and pain.  He also endorses photophobia.  He denies any trauma or foreign bodies going into the eye.   However, he works as a .      The patient is currently afebrile and nontoxic in appearance.  Vital signs are stable.  On physical exam, visual acuity is 20/40 in the left eye, 20/40 in the right eye and 20/30 the bilateral eyes.  On exam of the eyes, the bilateral conjunctivae are noninjected.  There is scleral injection noted to the right eye with increased vascularity.  Intra-ocular pressure in the left eye was 20, intra-ocular pressure of the right eye was 25.  Fluorescein stain is used to dilate the eye without evidence of corneal ulcer or abrasion.  The remaining physical exam was unremarkable.  There was no bony tenderness to the orbit.  I believe this patient likely has an anterior  uveitis versus scleritis.  I will prescribe steroid eyedrops refer to ophthalmology.  This was discussed in detail with the patient verbalized understanding and agreement.  He is currently safe and stable for discharge with ophtho follow-up the next few days.                   Clinical Impression:   Final diagnoses:  [H20.9] Anterior uveitis (Primary)        ED Disposition Condition    Discharge Stable          ED Prescriptions       Medication Sig Dispense Start Date End Date Auth. Provider    prednisoLONE acetate (PRED FORTE) 1 % DrpS Place 1 drop into the right eye 3 (three) times daily. for 10 days 10 mL 1/24/2023 2/3/2023 Ginny Ho PA-C    indomethacin (INDOCIN) 50 MG capsule Take 1 capsule (50 mg total) by mouth 2 (two) times daily with meals. for 10 days 20 capsule 1/24/2023 2/3/2023 Ginny Ho PA-C          Follow-up Information    None          Ginny Ho PA-C  01/24/23 1700

## 2023-01-24 NOTE — TELEPHONE ENCOUNTER
----- Message from Shani Andrews sent at 1/24/2023 12:09 PM CST -----  Contact: Ramos @[phone]  Pt need an ER fu appt for pain and swelling in Right eye. Please give pt a call back to further assist

## 2023-01-24 NOTE — DISCHARGE INSTRUCTIONS
Call [phone] to make an appointment with eye doctor.  Tell the appointment desk that you were seen in the ED and need a follow up appointment.  Return to the ED if your vision worsens in any way.

## 2023-01-24 NOTE — TELEPHONE ENCOUNTER
----- Message from Sushil Sierra sent at 1/24/2023  3:43 PM CST -----  Contact: [phone]  Pt is returning a call for an ER fu appt for pain and swelling in Right eye. Please give pt a call back to further assist

## 2023-01-27 ENCOUNTER — OFFICE VISIT (OUTPATIENT)
Dept: OPTOMETRY | Facility: CLINIC | Age: 53
End: 2023-01-27
Payer: MEDICAID

## 2023-01-27 DIAGNOSIS — H20.00 ACUTE IRITIS, RIGHT EYE: Primary | ICD-10-CM

## 2023-01-27 PROCEDURE — 99999 PR PBB SHADOW E&M-EST. PATIENT-LVL III: CPT | Mod: PBBFAC,,, | Performed by: OPTOMETRIST

## 2023-01-27 PROCEDURE — 1159F MED LIST DOCD IN RCRD: CPT | Mod: CPTII,,, | Performed by: OPTOMETRIST

## 2023-01-27 PROCEDURE — 1159F PR MEDICATION LIST DOCUMENTED IN MEDICAL RECORD: ICD-10-PCS | Mod: CPTII,,, | Performed by: OPTOMETRIST

## 2023-01-27 PROCEDURE — 92004 COMPRE OPH EXAM NEW PT 1/>: CPT | Mod: S$PBB,,, | Performed by: OPTOMETRIST

## 2023-01-27 PROCEDURE — 92004 PR EYE EXAM, NEW PATIENT,COMPREHESV: ICD-10-PCS | Mod: S$PBB,,, | Performed by: OPTOMETRIST

## 2023-01-27 PROCEDURE — 1160F RVW MEDS BY RX/DR IN RCRD: CPT | Mod: CPTII,,, | Performed by: OPTOMETRIST

## 2023-01-27 PROCEDURE — 1160F PR REVIEW ALL MEDS BY PRESCRIBER/CLIN PHARMACIST DOCUMENTED: ICD-10-PCS | Mod: CPTII,,, | Performed by: OPTOMETRIST

## 2023-01-27 PROCEDURE — 99999 PR PBB SHADOW E&M-EST. PATIENT-LVL III: ICD-10-PCS | Mod: PBBFAC,,, | Performed by: OPTOMETRIST

## 2023-01-27 PROCEDURE — 99213 OFFICE O/P EST LOW 20 MIN: CPT | Mod: PBBFAC,PO | Performed by: OPTOMETRIST

## 2023-01-27 NOTE — PROGRESS NOTES
Subjective:       Patient ID: Ramos Stewart is a 52 y.o. male      Chief Complaint   Patient presents with    Eye Problem     History of Present Illness  Dls: 3 days ago   ER (1/24/23)    51 y/o male presents today with c/o f/u ER uveitis od.   Pt c/o still having pain 3-4 od red od blurry vision od photophobia od no tearing no mucous.  Pt was given PF gtts OD TID and antibotics PO BID.    Eye meds  PF OD TID      Assessment/Plan:     1. Acute iritis, right eye  Increase PF to QID OD x 5 days, then decrease to BID x 1 week.     Note: cycloplegic not prescribed because pt concerned about effects on vision.      Follow up if symptoms worsen or fail to improve.